# Patient Record
Sex: FEMALE | URBAN - METROPOLITAN AREA
[De-identification: names, ages, dates, MRNs, and addresses within clinical notes are randomized per-mention and may not be internally consistent; named-entity substitution may affect disease eponyms.]

---

## 2017-05-08 NOTE — PATIENT DISCUSSION
Continue: Refresh Optive (carboxymethylcellulose-glycern): drops: 0.5-0.9% 1 drop twice a day into both eyes

## 2017-11-06 NOTE — PATIENT DISCUSSION
NTG, OU:  OPTIC DISC CUPPING WITH NORMAL INTRAOCULAR PRESSURE and VF CHANGES OD&gt;OS THAT CORRELATE TO RNFL CHANGES. RECOMMEND SLT OD THEN OS. FOLLOW AS SCHEDULED.

## 2017-11-06 NOTE — PATIENT DISCUSSION
ECTROPION, OU:  VISUALLY SIGNIFICANT TO THE PATIENT. RECOMMEND ARTIFICIAL TEARS OR OR LUBRICATING OINTMENT PRN. REFER TO OCULOPLASTIC SPECIALIST.

## 2017-11-06 NOTE — PATIENT DISCUSSION
VS ASTIGMATISM OD AFFECTING PT ABILITY TO ALIGN GOLF SWING. RECOMMEND SPEC RX, IF NO IMPROVEMENT, CONSIDER LRI.

## 2017-12-18 NOTE — PATIENT DISCUSSION
NTG (F/U) Counseling:  Intraocular pressure is within acceptable limits. I have reviewed the regimen of glaucoma drops with the patient and have stressed the importance of compliance. Patient instructed to continue current medication and return for follow-up as scheduled.

## 2017-12-18 NOTE — PATIENT DISCUSSION
NTG, OU: S/P SLT OU. INTRAOCULAR PRESSURE IS WITHIN ACCEPTABLE LIMITS. CONTNUE NO DROPS. RETURN FOR FOLLOW-UP AS SCHEDULED.

## 2017-12-18 NOTE — PATIENT DISCUSSION
MODERATE DRY EYES: CONTINUE DISAPPEARING PRESERVATIVE OR PRESERVATIVE FREE ARTIFICIAL TEARS BID &ndash; QID4-6X A DAY, OU AND THE DAILY INTAKE OF OMEGA-3 DHA/EPA FATTY ACIDS TO HELP RELIEVE SYMPTOMS. ADD NIGHTLY LUBRICATING OINTMENT OR GEL.

## 2018-04-17 NOTE — PATIENT DISCUSSION
NTG, OU: S/P SLT OU. INTRAOCULAR PRESSURE IS WITHIN ACCEPTABLE LIMITS. CONTNUE NO DROPS. RETURN FOR FOLLOW-UP AS SCHEDULED.  GOAL IOP &lt;17

## 2018-08-21 NOTE — PATIENT DISCUSSION
SUBJECTIVE VISUAL CHANGES DUE TO CYL OD - TRY CLOSING RIGHT EYE WHEN PLACING WALTER, ALTERNATIVELY CTL OR SPEC RX OR LRI

## 2018-08-21 NOTE — PATIENT DISCUSSION
NTG, OU: INTRAOCULAR PRESSURE IS WITHIN ACCEPTABLE LIMITS. CONTNUE NO DROPS. RETURN FOR FOLLOW-UP AS SCHEDULED.  GOAL IOP &lt;17

## 2018-12-31 ENCOUNTER — IMPORTED ENCOUNTER (OUTPATIENT)
Dept: URBAN - METROPOLITAN AREA CLINIC 9 | Facility: CLINIC | Age: 73
End: 2018-12-31

## 2019-01-10 ENCOUNTER — IMPORTED ENCOUNTER (OUTPATIENT)
Dept: URBAN - METROPOLITAN AREA CLINIC 9 | Facility: CLINIC | Age: 74
End: 2019-01-10

## 2019-01-17 ENCOUNTER — IMPORTED ENCOUNTER (OUTPATIENT)
Dept: URBAN - METROPOLITAN AREA CLINIC 9 | Facility: CLINIC | Age: 74
End: 2019-01-17

## 2019-05-24 NOTE — PATIENT DISCUSSION
MEIBOMIAN GLAND DYSFUNCTION, OU:  PRESCRIBE WARM COMPRESSES AND EYELID SCRUBS QD-BID, ARTIFICIAL TEARS BID-QID, THE DAILY INTAKE OF OMEGA-3 FATTY ACIDS  . RETURN FOR FOLLOW-UP AS SCHEDULED.

## 2019-10-01 NOTE — PATIENT DISCUSSION
NTG, OU: INTRAOCULAR PRESSURE IS ELEVATED SINCE LAST VISIT OS, OD IS WITHIN ACCEPTABLE LIMITS. WILL CONTINUE TO MONITOR. SCHEDULE VF, LOW THRESHOLD FOR TX. RETURN FOR FOLLOW-UP AS SCHEDULED.  GOAL IOP &lt;17

## 2020-09-29 NOTE — PATIENT DISCUSSION
NTG, OU: BORDERLINE IOP. LOW THRESHOLD FOR ADDITIONAL TREATMENT. CONTINUE ON NO GTTS. UNABLE TO GET RNFL DUE TO HEAD POSISTIONING.  FOLLOW

## 2021-03-15 NOTE — PATIENT DISCUSSION
NTG, OU: BORDERLINE IOP. LOW THRESHOLD FOR ADDITIONAL TREATMENT. CONTINUE ON NO GTTS. UNABLE TO GET RNFL AND VF DUE TO HEAD POSISTIONING.  FOLLOW

## 2021-10-16 ASSESSMENT — VISUAL ACUITY
OS_SC: 20/40 SN
OD_SC: 20/40 SN
OS_SC: 20/60 SN
OD_SC: 20/200 SN
OD_CC: 20/50 SN
OS_CC: 20/50 SN
OD_SC: 20/40 SN

## 2021-10-16 ASSESSMENT — KERATOMETRY
OD_AXISANGLE_DEGREES: 180
OS_K1POWER_DIOPTERS: 45.75
OS_K2POWER_DIOPTERS: 46.25
OS_AXISANGLE2_DEGREES: 155
OS_AXISANGLE_DEGREES: 65
OD_K2POWER_DIOPTERS: 46.25
OD_AXISANGLE2_DEGREES: 90
OD_K1POWER_DIOPTERS: 46.25

## 2021-10-16 ASSESSMENT — TONOMETRY
OS_IOP_MMHG: 31
OS_IOP_MMHG: 20
OD_IOP_MMHG: 18
OD_IOP_MMHG: 29
OD_IOP_MMHG: 22

## 2022-03-21 NOTE — PATIENT DISCUSSION
BORDERLINE IOP. LOW THRESHOLD FOR ADDITIONAL TREATMENT. CONTINUE ON NO GTTS. UNABLE TO GET RNFL AND VF DUE TO HEAD POSISTIONING. FOLLOW.

## 2022-03-21 NOTE — PATIENT DISCUSSION
No evidence of choroidal melanoma. Discussed condition with patient. Recommend UV protection and annual fundus exam. Instructed to call if new symptoms.

## 2022-06-25 RX ORDER — LOSARTAN POTASSIUM 50 MG/1
TABLET ORAL
COMMUNITY
End: 2022-08-08 | Stop reason: SDUPTHER

## 2022-06-25 RX ORDER — LORATADINE 10 MG/1
10 TABLET ORAL DAILY PRN
COMMUNITY
End: 2022-08-08

## 2022-06-25 RX ORDER — SIMVASTATIN 40 MG
40 TABLET ORAL EVERY EVENING
COMMUNITY
End: 2022-08-08 | Stop reason: SDUPTHER

## 2022-06-25 RX ORDER — ATENOLOL 50 MG/1
TABLET ORAL
COMMUNITY
End: 2022-08-08 | Stop reason: SDUPTHER

## 2022-08-08 PROBLEM — E78.00 HYPERCHOLESTEROLEMIA: Status: ACTIVE | Noted: 2022-08-08

## 2022-08-08 PROBLEM — G47.33 MODERATE OBSTRUCTIVE SLEEP APNEA: Status: ACTIVE | Noted: 2022-08-08

## 2022-08-08 PROBLEM — M25.579 ANKLE PAIN: Status: ACTIVE | Noted: 2022-08-08

## 2022-08-08 PROBLEM — I10 HYPERTENSION: Status: ACTIVE | Noted: 2022-08-08

## 2022-08-08 PROBLEM — E78.5 HYPERLIPIDEMIA: Status: ACTIVE | Noted: 2022-08-08

## 2022-08-08 PROBLEM — I48.91 ATRIAL FIBRILLATION (HCC): Status: ACTIVE | Noted: 2022-08-08

## 2022-08-08 PROBLEM — I49.9 IRREGULAR CARDIAC RHYTHM: Status: ACTIVE | Noted: 2022-08-08

## 2022-08-08 PROBLEM — M25.879 IMPINGEMENT OF ANKLE JOINT: Status: ACTIVE | Noted: 2022-08-08

## 2022-08-08 PROBLEM — I10 ESSENTIAL HYPERTENSION: Status: ACTIVE | Noted: 2022-08-08

## 2022-08-08 PROBLEM — R91.1 PULMONARY NODULE: Status: ACTIVE | Noted: 2022-08-08

## 2022-08-08 PROBLEM — E78.5 DYSLIPIDEMIA: Status: ACTIVE | Noted: 2022-08-08

## 2022-08-08 PROBLEM — J30.2 SEASONAL ALLERGIES: Status: ACTIVE | Noted: 2022-08-08

## 2022-08-30 PROBLEM — I48.91 ATRIAL FIBRILLATION (HCC): Status: ACTIVE | Noted: 2018-08-07

## 2023-06-05 NOTE — PATIENT DISCUSSION
Diagnosis:Low-tension glaucoma, right eye, mild stage Tdap; 26-Apr-2022 18:42; Conchis Saenz (RN); [employer]; R2710in (Exp. Date: 21-Jan-2024); IntraMuscular; Deltoid Left.; 0.5 milliLiter(s); VIS (VIS Published: 09-May-2013, VIS Presented: 26-Apr-2022);